# Patient Record
Sex: MALE | URBAN - METROPOLITAN AREA
[De-identification: names, ages, dates, MRNs, and addresses within clinical notes are randomized per-mention and may not be internally consistent; named-entity substitution may affect disease eponyms.]

---

## 2019-04-04 ENCOUNTER — LAB SERVICES (OUTPATIENT)
Dept: LAB | Age: 49
End: 2019-04-04

## 2019-04-04 ENCOUNTER — OFFICE VISIT (OUTPATIENT)
Dept: RHEUMATOLOGY | Age: 49
End: 2019-04-04

## 2019-04-04 VITALS
BODY MASS INDEX: 29.74 KG/M2 | WEIGHT: 200.8 LBS | HEIGHT: 69 IN | HEART RATE: 88 BPM | SYSTOLIC BLOOD PRESSURE: 128 MMHG | DIASTOLIC BLOOD PRESSURE: 72 MMHG

## 2019-04-04 DIAGNOSIS — M06.4 INFLAMMATORY POLYARTHROPATHY (CMD): ICD-10-CM

## 2019-04-04 DIAGNOSIS — M25.50 ARTHRALGIA, UNSPECIFIED JOINT: ICD-10-CM

## 2019-04-04 DIAGNOSIS — M06.4 INFLAMMATORY POLYARTHROPATHY (CMD): Primary | ICD-10-CM

## 2019-04-04 LAB
CRP SERPL-MCNC: 1 MG/DL (ref 0–1)
RHEUMATOID FACT SERPL-ACNC: <8.6 [IU]/ML
SEDIMENTATION RATE, RBC: 29 MM/H (ref 0–10)

## 2019-04-04 PROCEDURE — 86200 CCP ANTIBODY: CPT | Performed by: INTERNAL MEDICINE

## 2019-04-04 PROCEDURE — 85652 RBC SED RATE AUTOMATED: CPT | Performed by: INTERNAL MEDICINE

## 2019-04-04 PROCEDURE — 99204 OFFICE O/P NEW MOD 45 MIN: CPT | Performed by: INTERNAL MEDICINE

## 2019-04-04 PROCEDURE — 87040 BLOOD CULTURE FOR BACTERIA: CPT | Performed by: INTERNAL MEDICINE

## 2019-04-04 PROCEDURE — 36415 COLL VENOUS BLD VENIPUNCTURE: CPT | Performed by: INTERNAL MEDICINE

## 2019-04-04 PROCEDURE — 86140 C-REACTIVE PROTEIN: CPT | Performed by: INTERNAL MEDICINE

## 2019-04-04 PROCEDURE — 86430 RHEUMATOID FACTOR TEST QUAL: CPT | Performed by: INTERNAL MEDICINE

## 2019-04-04 RX ORDER — LEVOTHYROXINE SODIUM 100 UG/1
1 CAPSULE ORAL
COMMUNITY
Start: 2019-03-26

## 2019-04-04 RX ORDER — ACETAMINOPHEN 325 MG/1
650 TABLET ORAL
COMMUNITY

## 2019-04-04 RX ORDER — LEVOTHYROXINE SODIUM 88 UG/1
88 CAPSULE ORAL
COMMUNITY
Start: 2019-03-26

## 2019-04-04 RX ORDER — FLUTICASONE PROPIONATE 50 MCG
2 SPRAY, SUSPENSION (ML) NASAL
COMMUNITY

## 2019-04-04 RX ORDER — LORATADINE 10 MG/1
10 TABLET ORAL
COMMUNITY

## 2019-04-04 RX ORDER — MELOXICAM 15 MG/1
15 TABLET ORAL DAILY
Qty: 30 TABLET | Refills: 0 | Status: SHIPPED | OUTPATIENT
Start: 2019-04-04

## 2019-04-05 LAB — CCP IGG FLD IA-ACNC: 2.2 U/ML (ref 0–7)

## 2019-04-09 LAB — APPEARANCE SPEC: NORMAL

## 2020-10-12 ENCOUNTER — HOSPITAL ENCOUNTER (OUTPATIENT)
Age: 50
Discharge: HOME | End: 2020-10-12
Payer: COMMERCIAL

## 2020-10-12 VITALS
TEMPERATURE: 97 F | RESPIRATION RATE: 16 BRPM | SYSTOLIC BLOOD PRESSURE: 119 MMHG | OXYGEN SATURATION: 97 % | HEART RATE: 80 BPM | DIASTOLIC BLOOD PRESSURE: 87 MMHG

## 2020-10-12 DIAGNOSIS — K13.0 INFECTION OF LIP: Primary | ICD-10-CM

## 2020-10-12 PROCEDURE — 99202 OFFICE O/P NEW SF 15 MIN: CPT | Performed by: NURSE PRACTITIONER

## 2020-10-12 RX ORDER — AMOXICILLIN AND CLAVULANATE POTASSIUM 875; 125 MG/1; MG/1
1 TABLET, FILM COATED ORAL 2 TIMES DAILY
Qty: 20 TABLET | Refills: 0 | Status: SHIPPED | OUTPATIENT
Start: 2020-10-12 | End: 2020-10-22

## 2020-10-12 NOTE — ED INITIAL ASSESSMENT (HPI)
Noticed swelling to upper lip this morning, thought was allergic rx to something. Took benadryl at 7am and 12pm. Noticed later this afternoon there is a small cut to inside upper lip where the swelling is. Tender.

## 2020-10-12 NOTE — ED PROVIDER NOTES
Patient Seen in: [id number] [facility] [place]      History   Patient presents with:  Mouth/Lip Problem    Stated Complaint: Lip Swelling    23-year-old male presents today with swelling to the right upper lip.   States noticed the swelling this morning Comments: Small laceration to the mucosa of the right upper lip near the gumline. Mild swelling is noted to the area. No fluctuance. No drainage from the wound. Neck:      Musculoskeletal: Normal range of motion.    Cardiovascular:      Rate and Rhythm

## 2020-11-01 ENCOUNTER — OFFICE VISIT (OUTPATIENT)
Dept: FAMILY MEDICINE CLINIC | Facility: CLINIC | Age: 50
End: 2020-11-01
Payer: COMMERCIAL

## 2020-11-01 VITALS
HEIGHT: 69 IN | BODY MASS INDEX: 28.14 KG/M2 | TEMPERATURE: 97 F | SYSTOLIC BLOOD PRESSURE: 125 MMHG | OXYGEN SATURATION: 97 % | WEIGHT: 190 LBS | HEART RATE: 80 BPM | DIASTOLIC BLOOD PRESSURE: 88 MMHG | RESPIRATION RATE: 16 BRPM

## 2020-11-01 DIAGNOSIS — S39.012A LUMBOSACRAL STRAIN, INITIAL ENCOUNTER: Primary | ICD-10-CM

## 2020-11-01 PROCEDURE — 3079F DIAST BP 80-89 MM HG: CPT | Performed by: PHYSICIAN ASSISTANT

## 2020-11-01 PROCEDURE — 99202 OFFICE O/P NEW SF 15 MIN: CPT | Performed by: PHYSICIAN ASSISTANT

## 2020-11-01 PROCEDURE — 3074F SYST BP LT 130 MM HG: CPT | Performed by: PHYSICIAN ASSISTANT

## 2020-11-01 PROCEDURE — 3008F BODY MASS INDEX DOCD: CPT | Performed by: PHYSICIAN ASSISTANT

## 2020-11-01 RX ORDER — CYCLOBENZAPRINE HCL 10 MG
10 TABLET ORAL 3 TIMES DAILY PRN
Qty: 30 TABLET | Refills: 0 | Status: SHIPPED | OUTPATIENT
Start: 2020-11-01 | End: 2020-11-30

## 2020-11-01 NOTE — PROGRESS NOTES
CHIEF COMPLAINT:     Patient presents with:  Back Pain: lower back pain x 1 day. pt states moved piano and hurt back      HPI:     Yolie Neville is a 53year old male who is here for complaints of lower back pain.  Bilateral.  Lifting couches and electric bruits  LUNGS: clear to auscultation  CARDIO: S1/S2 RRR  GI:Soft, NT, ND, BS X4, no mass or organomegaly, no guarding or rebound. EXTREMITIES: no cyanosis, clubbing or edema  NEURO: 2+ DTR's intact and equal bilaterally. Sensation intact.   BACK:lumbar

## 2020-11-01 NOTE — PATIENT INSTRUCTIONS
Back Sprain or Strain     Injury to the muscles (strain) or ligaments (sprain) around the spine can be troubling.  Injury may occur after a sudden forceful twisting or bending such as in a car accident, after a simple awkward movement, or after lifting so · You can alternate the ice and heat. Talk with your healthcare provider to find out the best treatment or therapy for your back pain. · Therapeutic massage can help relax the back muscles without stretching them. · Be aware of safe lifting methods.  Nickolas Cat Call your healthcare provider right away if any of the following occur:  · Pain gets worse or spreads to your arms or legs  · Weakness or numbness in one or both arms or legs  · Numbness in the groin or genital area  Yolis last reviewed this educational © 7281-2609 [employer] [address] [address] [facility], [facility], [facility] [place]. All rights reserved. This information is not intended as a substitute for professional medical care. Always follow your healthcare professional's instructions.         Sasha Ceballos · Prescription or over-the-counter medicines. These help reduce inflammation, swelling, and pain. NSAIDs (nonsteroidal anti-inflammatory drugs) are the most common medicines used. Medicines may be prescribed or bought over the counter.  They may be given as Support the injured area  Wrapping the injured area provides support for short, necessary activities. Be careful not to wrap the area too tightly. This could cut off the blood supply. · Support a wrist, elbow, or shoulder with a sling.   · Wrap an ankle o © 3157-7762 [employer] [address]. [address] [facility], [facility] [place]. All rights reserved. This information is not intended as a substitute for professional medical care. Always follow your healthcare professional's instructions.

## 2020-11-06 ENCOUNTER — OFFICE VISIT (OUTPATIENT)
Dept: FAMILY MEDICINE CLINIC | Facility: CLINIC | Age: 50
End: 2020-11-06
Payer: COMMERCIAL

## 2020-11-06 VITALS
HEART RATE: 95 BPM | DIASTOLIC BLOOD PRESSURE: 84 MMHG | TEMPERATURE: 98 F | WEIGHT: 190 LBS | SYSTOLIC BLOOD PRESSURE: 110 MMHG | HEIGHT: 69 IN | RESPIRATION RATE: 16 BRPM | OXYGEN SATURATION: 96 % | BODY MASS INDEX: 28.14 KG/M2

## 2020-11-06 DIAGNOSIS — S39.012D LUMBOSACRAL STRAIN, SUBSEQUENT ENCOUNTER: Primary | ICD-10-CM

## 2020-11-06 PROCEDURE — 3008F BODY MASS INDEX DOCD: CPT | Performed by: PHYSICIAN ASSISTANT

## 2020-11-06 PROCEDURE — 99072 ADDL SUPL MATRL&STAF TM PHE: CPT | Performed by: PHYSICIAN ASSISTANT

## 2020-11-06 PROCEDURE — 3074F SYST BP LT 130 MM HG: CPT | Performed by: PHYSICIAN ASSISTANT

## 2020-11-06 PROCEDURE — 99213 OFFICE O/P EST LOW 20 MIN: CPT | Performed by: PHYSICIAN ASSISTANT

## 2020-11-06 PROCEDURE — 3079F DIAST BP 80-89 MM HG: CPT | Performed by: PHYSICIAN ASSISTANT

## 2020-11-06 NOTE — PROGRESS NOTES
CHIEF COMPLAINT:     Patient presents with:  Back Pain      HPI:     Yolie Neville is a 53year old male who is here for evaluation to return to work. Seen in [facility] 11/1/20 by myself for lumbosacral sprain.   At this point his feeling well and feels he can Hue Dinh is a 53year old male who presents with complaints of   Lumbosacral strain, subsequent encounter  (primary encounter diagnosis)  . Resolved. PLAN:   Return to work. No limitations. Follow up PCP prn.     Requested Prescriptions

## 2020-11-25 ENCOUNTER — OFFICE VISIT (OUTPATIENT)
Dept: FAMILY MEDICINE CLINIC | Facility: CLINIC | Age: 50
End: 2020-11-25
Payer: COMMERCIAL

## 2020-11-25 VITALS
HEIGHT: 69 IN | TEMPERATURE: 97 F | WEIGHT: 190 LBS | HEART RATE: 75 BPM | OXYGEN SATURATION: 98 % | BODY MASS INDEX: 28.14 KG/M2 | SYSTOLIC BLOOD PRESSURE: 112 MMHG | DIASTOLIC BLOOD PRESSURE: 70 MMHG

## 2020-11-25 DIAGNOSIS — Z20.822 ENCOUNTER FOR SCREENING LABORATORY TESTING FOR COVID-19 VIRUS: Primary | ICD-10-CM

## 2020-11-25 DIAGNOSIS — B34.9 VIRAL SYNDROME: ICD-10-CM

## 2020-11-25 PROCEDURE — 99213 OFFICE O/P EST LOW 20 MIN: CPT | Performed by: PHYSICIAN ASSISTANT

## 2020-11-25 PROCEDURE — 3074F SYST BP LT 130 MM HG: CPT | Performed by: PHYSICIAN ASSISTANT

## 2020-11-25 PROCEDURE — 3078F DIAST BP <80 MM HG: CPT | Performed by: PHYSICIAN ASSISTANT

## 2020-11-25 PROCEDURE — 3008F BODY MASS INDEX DOCD: CPT | Performed by: PHYSICIAN ASSISTANT

## 2020-11-25 PROCEDURE — 99072 ADDL SUPL MATRL&STAF TM PHE: CPT | Performed by: PHYSICIAN ASSISTANT

## 2020-11-25 NOTE — PROGRESS NOTES
CHIEF COMPLAINT:   Patient presents with:  Cold: fatigue, cough, sinus pressure x 5 days     HPI:   Kamla Anguiano is a 53year old male who presents to [facility] for COVID-19 testing with symptoms/history as described below:  Onset:  5 days ago  Exposure to COV Drug use: Not on file        REVIEW OF SYSTEMS:   GENERAL: normal appetite, drinking fluids  SKIN: Denies rash or other lesions  HEENT: See HPI  LUNGS: See HPI  CARDIOVASCULAR: denies palpitations; see HPI  GI: see HPI  NEURO: Denies lightheadedness; see - Advised to follow up with PCP or IC for reevaluation for persistent symptoms. Supportive care: Increase fluids and rest.   May consider OTC tylenol or ibuprofen as needed and directed on packaging     All questions were addressed and answered. · You may use over-the-counter acetaminophen or ibuprofen for fever, muscle aching, and headache, unless another medicine was prescribed for this.  If you have chronic liver or kidney disease or ever had a stomach ulcer or gastrointestinal bleeding, talk wi · Frequent diarrhea (more than 5 times a day); blood (red or black color) or mucus in diarrhea  · Feeling weak, dizzy, or like you are going to faint  · Extreme thirst  · Fever of 100.4°F (38°C) or higher, or as directed by your healthcare provider  BARBRA RODRIGUEZ [facility] · Wear a cloth face mask around other people. During a public health emergency, medical face masks may be reserved for healthcare workers. You may need to make a cloth face mask of your own. You can do this using a bandana, T-shirt, or other cloth.  The CDC · Wear a face mask. This is to protect other people from your germs. If you are not able to wear a mask, your caregivers should. During a public health emergency, medical face masks may be reserved for healthcare workers.  You may need to make a cloth face · Taking over-the-counter (OTC) pain medicine. These are used to help ease pain and reduce fever. Follow your healthcare provider's instructions for which OTC medicine to use.   If you've been in the hospital for suspected or confirmed COVID-19 and now are When you are sick with COVID-19, you should stay away from other people. This is called self-isolation.    Your limits are different if you've had COVID-19 in the last 3 months but are fully recovered without symptoms and you have been exposed to someone wi If you have a weak immune system and COVID-19, or if you've had severe COVID-19,  your instructions on when to stop isolation will be somewhat different. Some conditions and treatments can cause a weak immune system.  These include cancer treatment, bone ma · Confusion or trouble waking  · Fainting or loss of consciousness  · Coughing up blood  Going home from the hospital   If you were diagnosed with COVID-19 and were recently discharged from the hospital:   · Follow the instructions above for self-care and

## 2020-11-25 NOTE — PATIENT INSTRUCTIONS
Test will be finalized in 3-4 days      Viral Syndrome (Adult)  A viral illness may cause a number of symptoms such as fever. Other symptoms depend on the part of the body that the virus affects.  If it settles in your nose, throat, and lungs, it may cause disease, ask your healthcare provider how much and what types of fluids you should drink to prevent dehydration. If you have kidney disease, drinking too much fluid can cause it build up in the your body and be dangerous to your health.   · Over-the-counter symptoms  · Stay home. Call your healthcare provider and tell them you have symptoms of COVID-19. Do this before going to any hospital or clinic. Follow your provider's instructions. You may be advised to isolate yourself at home.  This is called self-isola get medical care. Don't go to work, school, or public areas. Don't use public transportation or taxis. · Follow all instructions from your healthcare provider. Call your healthcare provider’s office before going.  They can prepare and give you instructions aimed at helping your body while it fights the virus. This is known as supportive care. For serious COVID-19, you may need to stay in the hospital. Supportive care includes:   · Getting rest. This helps your body fight the illness. · Staying hydrated.    symptoms. · Clean home surfaces often with disinfectant. This includes phones, kitchen counters, fridge door handle, bathroom surfaces, and others. · Don’t let anyone share household items with the sick person.  This includes eating and drinking tools, to COVID-19, or if you've had severe COVID-19,  your instructions on when to stop isolation will be somewhat different. Some conditions and treatments can cause a weak immune system.  These include cancer treatment, bone marrow or organ transplants, and condit consciousness  · Coughing up blood  Going home from the hospital   If you were diagnosed with COVID-19 and were recently discharged from the hospital:   · Follow the instructions above for self-care and isolation.   · Follow the hospital healthcare team’s s

## 2020-11-30 ENCOUNTER — OFFICE VISIT (OUTPATIENT)
Dept: FAMILY MEDICINE CLINIC | Facility: CLINIC | Age: 50
End: 2020-11-30
Payer: COMMERCIAL

## 2020-11-30 VITALS
WEIGHT: 190 LBS | SYSTOLIC BLOOD PRESSURE: 128 MMHG | HEART RATE: 69 BPM | BODY MASS INDEX: 28.14 KG/M2 | TEMPERATURE: 98 F | DIASTOLIC BLOOD PRESSURE: 80 MMHG | RESPIRATION RATE: 18 BRPM | HEIGHT: 69 IN | OXYGEN SATURATION: 97 %

## 2020-11-30 DIAGNOSIS — B34.9 VIRAL SYNDROME: Primary | ICD-10-CM

## 2020-11-30 DIAGNOSIS — Z02.89 ENCOUNTER FOR PHYSICAL EXAMINATION RELATED TO EMPLOYMENT: ICD-10-CM

## 2020-11-30 PROCEDURE — 3008F BODY MASS INDEX DOCD: CPT | Performed by: NURSE PRACTITIONER

## 2020-11-30 PROCEDURE — 3074F SYST BP LT 130 MM HG: CPT | Performed by: NURSE PRACTITIONER

## 2020-11-30 PROCEDURE — 99072 ADDL SUPL MATRL&STAF TM PHE: CPT | Performed by: NURSE PRACTITIONER

## 2020-11-30 PROCEDURE — 99212 OFFICE O/P EST SF 10 MIN: CPT | Performed by: NURSE PRACTITIONER

## 2020-11-30 PROCEDURE — 3079F DIAST BP 80-89 MM HG: CPT | Performed by: NURSE PRACTITIONER

## 2020-11-30 NOTE — PATIENT INSTRUCTIONS
-Cleared for work. -Continue to wear mask.  -Follow up with PMD as needed. Viral Syndrome (Adult)  A viral illness may cause a number of symptoms such as fever. Other symptoms depend on the part of the body that the virus affects.  If it settles i coffee. If you have been diagnosed with a kidney disease, ask your healthcare provider how much and what types of fluids you should drink to prevent dehydration.  If you have kidney disease, drinking too much fluid can cause it build up in the your body and

## 2020-11-30 NOTE — PROGRESS NOTES
CHIEF COMPLAINT:   Patient presents with:  Physical: clearance for work. HPI:   Diann Mukherjee is a 53year old male who presents for a work clearance exam related to his employer.  Patient notes he had nasal congestion, cough, sore throat, diarrhea fa EARS: TM's intact and without erythema, no bulging, no retraction,no fluid, bony landmarks visualized. No erythema or swelling noted to ear canals or external ears.    NOSE: Nostrils patent, clear nasal discharge, nasal mucosa reddened   THROAT: Oral mucosa A viral illness may cause a number of symptoms such as fever. Other symptoms depend on the part of the body that the virus affects.  If it settles in your nose, throat, and lungs, it may cause cough, sore throat, congestion, runny nose, headache, earache an · Your appetite may be poor, so a light diet is fine. Avoid dehydration by drinking 8 to 12, 8-ounce glasses of fluids each day.  This may include water; orange juice; lemonade; apple, grape, and cranberry juice; clear fruit drinks; electrolyte replacement © 2811-3840 [employer] [address]. [address] [facility], [address] [place]. All rights reserved. This information is not intended as a substitute for professional medical care. Always follow your healthcare professional's instructions.               Brenda Blankenship

## 2022-03-17 ENCOUNTER — OFFICE VISIT (OUTPATIENT)
Dept: FAMILY MEDICINE CLINIC | Facility: CLINIC | Age: 52
End: 2022-03-17
Payer: COMMERCIAL

## 2022-03-17 VITALS
DIASTOLIC BLOOD PRESSURE: 88 MMHG | TEMPERATURE: 98 F | WEIGHT: 200 LBS | HEART RATE: 69 BPM | RESPIRATION RATE: 16 BRPM | OXYGEN SATURATION: 98 % | HEIGHT: 69 IN | SYSTOLIC BLOOD PRESSURE: 126 MMHG | BODY MASS INDEX: 29.62 KG/M2

## 2022-03-17 DIAGNOSIS — B34.9 VIRAL SYNDROME: Primary | ICD-10-CM

## 2022-03-17 PROCEDURE — 3079F DIAST BP 80-89 MM HG: CPT | Performed by: PHYSICIAN ASSISTANT

## 2022-03-17 PROCEDURE — 3008F BODY MASS INDEX DOCD: CPT | Performed by: PHYSICIAN ASSISTANT

## 2022-03-17 PROCEDURE — 3074F SYST BP LT 130 MM HG: CPT | Performed by: PHYSICIAN ASSISTANT

## 2022-03-17 PROCEDURE — 99213 OFFICE O/P EST LOW 20 MIN: CPT | Performed by: PHYSICIAN ASSISTANT

## 2022-03-17 RX ORDER — FLUTICASONE PROPIONATE 50 MCG
SPRAY, SUSPENSION (ML) NASAL DAILY
COMMUNITY

## 2025-02-03 ENCOUNTER — OFFICE VISIT (OUTPATIENT)
Dept: FAMILY MEDICINE CLINIC | Facility: CLINIC | Age: 55
End: 2025-02-03
Payer: COMMERCIAL

## 2025-02-03 VITALS
HEART RATE: 79 BPM | OXYGEN SATURATION: 100 % | BODY MASS INDEX: 29 KG/M2 | DIASTOLIC BLOOD PRESSURE: 84 MMHG | SYSTOLIC BLOOD PRESSURE: 132 MMHG | WEIGHT: 197 LBS | RESPIRATION RATE: 18 BRPM | TEMPERATURE: 97 F

## 2025-02-03 DIAGNOSIS — R19.7 VOMITING AND DIARRHEA: Primary | ICD-10-CM

## 2025-02-03 DIAGNOSIS — R11.10 VOMITING AND DIARRHEA: Primary | ICD-10-CM

## 2025-02-03 NOTE — PROGRESS NOTES
CHIEF COMPLAINT:     Chief Complaint   Patient presents with    Note For Work     Sick Thursday   Needs note for work thurs fri today        HPI:   Alexander Queen is a 54 year old male who presents for a work note excusing his absences and noting he is able to return to work. Patient reports he had vomiting and diarrhea that started last Thursday. Notes vomiting x 1 THursday and then intermittent diarrhea Friday and over the weekend. No other vomiting. Denies any blood in stool. Pt notes no diarrhea since yesterday morning. Notes feeling better today and would like to return to work tomorrow.   Tolerates PO well at home. No abodminal pain..  Denies any other aggravating or relieving factors at home. Denies any other treatment attempts prior to arrival.       Current Outpatient Medications   Medication Sig Dispense Refill    fluticasone propionate 50 MCG/ACT Nasal Suspension by Each Nare route daily.      Levothyroxine Sodium (SYNTHROID OR)         Past Medical History:    Thyroid disease      No past surgical history on file.      Social History     Socioeconomic History    Marital status:    Tobacco Use    Smoking status: Never    Smokeless tobacco: Current     Types: Chew     Social Drivers of Health      Received from [facility]    Social Connections    Received from [facility]    Housing Stability         REVIEW OF SYSTEMS:   GENERAL: Denies fever. Notes good appetite  SKIN: no rashes or abnormal skin lesions  HEENT: Denies sore throat,  sinus symptoms, or ear pain  LUNGS: Denies cough, denies shortness of breath or wheezing,   CARDIOVASCULAR: denies chest pain or palpitations   GI: + vomiting last Thursday. Notes diarrhea over the weekend. Last episode yesterday a.m. Denies blood in stool or abdominal pain  NEURO: Denies headaches    EXAM:   /84   Pulse 79   Temp 97.1 °F (36.2 °C)   Resp 18   Wt 197 lb (89.4 kg)   SpO2 100%   BMI 29.09 kg/m²   GENERAL:  well developed, well nourished,in no apparent distress  SKIN: no rashes,no suspicious lesions  HEAD: atraumatic, normocephalic.    EYES: conjunctiva clear,   NOSE: Nostrils patent, clear nasal discharge, nasal mucosa reddened   THROAT: Oral mucosa pink, moist. Posterior pharynx is not erythematous. No exudates. No tonsillar hypertrophy noted.  No trismus. Uvula midline with no swelling. Voice clear/normal. No stridor  NECK: Supple, non-tender  LUNGS: clear to auscultation bilaterally, no rales, wheezes or rhonchi. Breathing is non labored.  CARDIO: RRR without murmur  GI: soft, non distended. No visible peristalsis. No masses. No organomegaly. No tenderness in any quadrant. Bowel sounds: present. Guarding : none. Rigidity: none.   EXTREMITIES: no cyanosis, clubbing or edema  LYMPH:  No lymphadenopathy.        ASSESSMENT AND PLAN:       ICD-10-CM    1. Vomiting and diarrhea  R11.10     R19.7         Discussed physical exam and hpi with pt. Pt has reassuring physical exam. No signs of acute abdomen/peritonitis or dehydration.. Treatment options discussed with patient and explained in detail. We reviewed symptomatic care at home.The risks, benefits and potential side effects of possible medications were reviewed. Alternatives were discussed. Monitoring parameters and expected course outlined. Patient to call PCP or go to emergency department if symptoms fail to respond as outlined, or worsen in any way. The patient agreed with the plan.     Work note provided.    Patient Instructions   Follow up with PCP as needed.  2.   Go to the emergency department immediately if symptoms worsen, change, or if you have any concerns.  3. Work note provided as requested.

## 2025-02-11 ENCOUNTER — OFFICE VISIT (OUTPATIENT)
Dept: FAMILY MEDICINE CLINIC | Facility: CLINIC | Age: 55
End: 2025-02-11
Payer: COMMERCIAL

## 2025-02-11 VITALS
SYSTOLIC BLOOD PRESSURE: 126 MMHG | TEMPERATURE: 98 F | DIASTOLIC BLOOD PRESSURE: 84 MMHG | WEIGHT: 194 LBS | OXYGEN SATURATION: 97 % | RESPIRATION RATE: 16 BRPM | HEIGHT: 69 IN | HEART RATE: 78 BPM | BODY MASS INDEX: 28.73 KG/M2

## 2025-02-11 DIAGNOSIS — J01.00 ACUTE NON-RECURRENT MAXILLARY SINUSITIS: Primary | ICD-10-CM

## 2025-02-11 PROCEDURE — 99213 OFFICE O/P EST LOW 20 MIN: CPT | Performed by: FAMILY MEDICINE

## 2025-02-11 RX ORDER — LEVOTHYROXINE SODIUM 175 UG/1
175 TABLET ORAL
COMMUNITY
Start: 2025-01-27

## 2025-02-11 NOTE — PROGRESS NOTES
Alexander Queen is a 54 year old male.    S:  Patient presents today with the following concerns:  Chief Complaint   Patient presents with    Cold     4 days, productive cough, congestion, fatigue, chills / sweats, denies fever  OTC nyquil, dayquil   Was sick prior to this as well.  Sinus headache, lots of mucous.  No fevers.  No N/V/D.    Coughing.     Current Outpatient Medications   Medication Sig Dispense Refill    levothyroxine 175 MCG Oral Tab Take 1 tablet (175 mcg total) by mouth before breakfast.      amoxicillin clavulanate 875-125 MG Oral Tab Take 1 tablet by mouth 2 (two) times daily for 7 days. 14 tablet 0    fluticasone propionate 50 MCG/ACT Nasal Suspension by Each Nare route daily.      Levothyroxine Sodium (SYNTHROID OR)        Patient Active Problem List   Diagnosis    Hypothyroid     No family history on file.    REVIEW OF SYSTEMS:  GENERAL: fatigued  SKIN: denies any unusual skin lesions  EYES:denies vision change  LUNGS: denies shortness of breath with exertion  CARDIOVASCULAR: denies chest pain on exertion  GI: denies abdominal pain.  No N/V/D/C  : denies dysuria  MUSCULOSKELETAL: denies back pain  NEURO: headaches    EXAM:  /84   Pulse 78   Temp 97.5 °F (36.4 °C)   Resp 16   Ht 5' 9\" (1.753 m)   Wt 194 lb (88 kg)   SpO2 97%   BMI 28.65 kg/m²   Physical Exam  Constitutional:       General: He is not in acute distress.     Appearance: Normal appearance. He is not ill-appearing, toxic-appearing or diaphoretic.   HENT:      Head: Normocephalic and atraumatic.      Right Ear: Ear canal and external ear normal.      Left Ear: Ear canal and external ear normal.      Ears:      Comments: Some fluid behind bilateral TM's     Nose: Congestion present.      Mouth/Throat:      Mouth: Mucous membranes are moist.      Pharynx: Oropharynx is clear. No oropharyngeal exudate or posterior oropharyngeal erythema.   Eyes:      Extraocular Movements: Extraocular movements intact.       Conjunctiva/sclera: Conjunctivae normal.      Pupils: Pupils are equal, round, and reactive to light.   Cardiovascular:      Rate and Rhythm: Normal rate and regular rhythm.      Heart sounds: Normal heart sounds.   Pulmonary:      Effort: Pulmonary effort is normal.      Breath sounds: Normal breath sounds.   Musculoskeletal:      Cervical back: Neck supple. No rigidity or tenderness.   Lymphadenopathy:      Cervical: No cervical adenopathy.   Skin:     General: Skin is warm and dry.   Neurological:      General: No focal deficit present.      Mental Status: He is alert and oriented to person, place, and time.   Psychiatric:         Mood and Affect: Mood normal.         Behavior: Behavior normal.        ASSESSMENT AND PLAN:  Alexander Queen is a 54 year old male.  Encounter Diagnosis   Name Primary?    Acute non-recurrent maxillary sinusitis Yes       No results found.     No orders of the defined types were placed in this encounter.    Meds & Refills for this Visit:  Requested Prescriptions     Signed Prescriptions Disp Refills    amoxicillin clavulanate 875-125 MG Oral Tab 14 tablet 0     Sig: Take 1 tablet by mouth 2 (two) times daily for 7 days.     Imaging & Consults:  None  Augmentin as above.  Increase fluids, steam, rest.  Sinus rinses or saline nasal sprays or netti pot.  Tea with honey.   Warm compresses to the sinuses.  Follow up if symptoms worsen or do not improve.   Patient verbalizes understanding of treatment plan.      No follow-ups on file.

## (undated) NOTE — LETTER
[address] [facility] [address]  [phone]     Patient: Gretel Hawkins   YOB: 1970   Date of Visit: 11/25/2020     Dear Employer,        November 25, 2020    At [facility], we Persons infected with SARS-CoV-2 who never develop COVID-19 symptoms may discontinue isolation and other precautions 10 days after the date of their first positive RT-PCR test for SARS-CoV-2 RNA.     Persons who are asymptomatic but have been exposed, CDC r

## (undated) NOTE — LETTER
Date: 2/11/2025    Patient Name: Alexander Queen          To Whom it may concern:    This letter has been written at the patient's request. The above patient was seen at [facility] for treatment of a medical condition.    This patient should be excused from attending work 2/9/25, 2/10/25 and 2/11/25 due to illness.  He was seen in our clinic and is under our care.        Sincerely,      Tess Reyes PA-C

## (undated) NOTE — LETTER
Date: 2/3/2025    Patient Name: Alexander Queen          To Whom it may concern:     The above patient was seen at [facility] for treatment of a medical condition. Please excuse his recent absences this week. He can return to work on 2/4/25 as long as he is feeling better and is fever free for 24hrs without the use of fever-reducing medications.        Sincerely,    Jose Ron NP

## (undated) NOTE — LETTER
Date: 11/1/2020    Patient Name: Jeanette Mcdonald          To Whom it may concern: This letter has been written at the patient's request. The above patient was seen at the [facility] for treatment of a lumbar strain.   Return to work as gavi